# Patient Record
Sex: FEMALE
[De-identification: names, ages, dates, MRNs, and addresses within clinical notes are randomized per-mention and may not be internally consistent; named-entity substitution may affect disease eponyms.]

---

## 2018-02-08 ENCOUNTER — HOSPITAL ENCOUNTER (EMERGENCY)
Dept: HOSPITAL 42 - ED | Age: 38
Discharge: LEFT BEFORE DISCHARGE | End: 2018-02-08
Payer: COMMERCIAL

## 2018-02-08 VITALS
OXYGEN SATURATION: 100 % | HEART RATE: 76 BPM | SYSTOLIC BLOOD PRESSURE: 138 MMHG | TEMPERATURE: 99.2 F | RESPIRATION RATE: 18 BRPM | DIASTOLIC BLOOD PRESSURE: 83 MMHG

## 2018-02-08 VITALS — BODY MASS INDEX: 29.5 KG/M2

## 2018-02-08 DIAGNOSIS — R10.9: ICD-10-CM

## 2018-02-08 DIAGNOSIS — Z02.89: Primary | ICD-10-CM

## 2018-05-15 ENCOUNTER — HOSPITAL ENCOUNTER (EMERGENCY)
Dept: HOSPITAL 42 - ED | Age: 38
Discharge: HOME | End: 2018-05-15
Payer: COMMERCIAL

## 2018-05-15 VITALS
RESPIRATION RATE: 17 BRPM | OXYGEN SATURATION: 100 % | SYSTOLIC BLOOD PRESSURE: 122 MMHG | TEMPERATURE: 98.2 F | DIASTOLIC BLOOD PRESSURE: 83 MMHG

## 2018-05-15 VITALS — HEART RATE: 72 BPM

## 2018-05-15 VITALS — BODY MASS INDEX: 29.1 KG/M2

## 2018-05-15 DIAGNOSIS — N39.0: Primary | ICD-10-CM

## 2018-05-15 DIAGNOSIS — E78.1: ICD-10-CM

## 2018-05-15 LAB
ALBUMIN SERPL-MCNC: 4.4 G/DL (ref 3–4.8)
ALBUMIN/GLOB SERPL: 1.3 {RATIO} (ref 1.1–1.8)
ALT SERPL-CCNC: 35 U/L (ref 7–56)
AMORPH SED URNS QL MICRO: (no result)
APPEARANCE UR: CLEAR
AST SERPL-CCNC: 26 U/L (ref 14–36)
BACTERIA #/AREA URNS HPF: (no result) /[HPF]
BASOPHILS # BLD AUTO: 0.02 K/MM3 (ref 0–2)
BASOPHILS NFR BLD: 0.3 % (ref 0–3)
BILIRUB UR-MCNC: NEGATIVE MG/DL
BUN SERPL-MCNC: 12 MG/DL (ref 7–21)
CALCIUM SERPL-MCNC: 9.4 MG/DL (ref 8.4–10.5)
COLOR UR: YELLOW
EOSINOPHIL # BLD: 0 10*3/UL (ref 0–0.7)
EOSINOPHIL NFR BLD: 0.4 % (ref 1.5–5)
EPI CELLS #/AREA URNS HPF: (no result) /HPF (ref 0–5)
ERYTHROCYTE [DISTWIDTH] IN BLOOD BY AUTOMATED COUNT: 13.6 % (ref 11.5–14.5)
GFR NON-AFRICAN AMERICAN: > 60
GLUCOSE UR STRIP-MCNC: NEGATIVE MG/DL
GRANULOCYTES # BLD: 4.08 10*3/UL (ref 1.4–6.5)
GRANULOCYTES NFR BLD: 58 % (ref 50–68)
HGB BLD-MCNC: 14.5 G/DL (ref 12–16)
LEUKOCYTE ESTERASE UR-ACNC: (no result) LEU/UL
LIPASE SERPL-CCNC: 54 U/L (ref 23–300)
LYMPHOCYTES # BLD: 2 10*3/UL (ref 1.2–3.4)
LYMPHOCYTES NFR BLD AUTO: 27.7 % (ref 22–35)
MCH RBC QN AUTO: 28 PG (ref 25–35)
MCHC RBC AUTO-ENTMCNC: 33.6 G/DL (ref 31–37)
MCV RBC AUTO: 83.4 FL (ref 80–105)
MONOCYTES # BLD AUTO: 1 10*3/UL (ref 0.1–0.6)
MONOCYTES NFR BLD: 13.6 % (ref 1–6)
PH UR STRIP: 6 [PH] (ref 4.7–8)
PLATELET # BLD: 308 10^3/UL (ref 120–450)
PMV BLD AUTO: 10.1 FL (ref 7–11)
PROT UR STRIP-MCNC: (no result) MG/DL
RBC # BLD AUTO: 5.18 10^6/UL (ref 3.5–6.1)
RBC # UR STRIP: (no result) /UL
SP GR UR STRIP: 1.02 (ref 1–1.03)
URINE FINE GRANULAR CAST: (no result) /HPF (ref 0–2)
UROBILINOGEN UR STRIP-ACNC: 0.2 E.U./DL
WBC # BLD AUTO: 7 10^3/UL (ref 4.5–11)
WBC #/AREA URNS HPF: (no result) /HPF (ref 0–6)

## 2018-05-15 PROCEDURE — 85025 COMPLETE CBC W/AUTO DIFF WBC: CPT

## 2018-05-15 PROCEDURE — 81001 URINALYSIS AUTO W/SCOPE: CPT

## 2018-05-15 PROCEDURE — 74177 CT ABD & PELVIS W/CONTRAST: CPT

## 2018-05-15 PROCEDURE — 83690 ASSAY OF LIPASE: CPT

## 2018-05-15 PROCEDURE — 96374 THER/PROPH/DIAG INJ IV PUSH: CPT

## 2018-05-15 PROCEDURE — 96361 HYDRATE IV INFUSION ADD-ON: CPT

## 2018-05-15 PROCEDURE — 80053 COMPREHEN METABOLIC PANEL: CPT

## 2018-05-15 PROCEDURE — 99283 EMERGENCY DEPT VISIT LOW MDM: CPT

## 2018-05-15 PROCEDURE — 87086 URINE CULTURE/COLONY COUNT: CPT

## 2018-05-15 NOTE — CT
PROCEDURE:  CT Abdomen and Pelvis with contrast



HISTORY:

abd pain



COMPARISON:

None.



TECHNIQUE:

Following the intravenous administration of iodinated contrast 

material, a CT examination of the abdomen and pelvis performed from 

the domes of the diaphragms to the symphysis pubis with reformatted 

datasets provided not only axial but also sagittal and coronal 

planes. Oral contrast was not administered as per referring physician 

request.



Contrast dose: Omnipaque 350, 100 cc



Radiation dose:



Total exam DLP = 628.22 mGy-cm.



This CT exam was performed using one or more of the following dose 

reduction techniques: Automated exposure control, adjustment of the 

mA and/or kV according to patient size, and/or use of iterative 

reconstruction technique.



FINDINGS:



LOWER THORAX:

Unremarkable. 



LIVER:

Diffuse hepatic steatosis identified without mass or intrahepatic 

biliary dilatation evident grossly. 



GALLBLADDER AND BILE DUCTS:

Unremarkable. 



PANCREAS:

Unremarkable. No gross lesion or ductal dilatation.



SPLEEN:

Unremarkable. 



ADRENALS:

Unremarkable. No mass. 



KIDNEYS AND URETERS:

Unremarkable. No hydronephrosis. No solid mass. 



VASCULATURE:

Unremarkable. No aortic aneurysm. 



BOWEL:

The stomach is largely collapsed.  There is no bowel obstruction 

appreciated or other suspicious bowel finding.  Moderate fecal 

loading is seen throughout the proximal and middle thirds of the 

colon.  Sigmoid diverticular changes are identified without 

diverticulitis. 



APPENDIX:

Normal appendix. 



PERITONEUM:

Unremarkable. No free fluid. No free air. 



LYMPH NODES:

Unremarkable. No enlarged lymph nodes. 



BLADDER:

Unremarkable. 



REPRODUCTIVE:

Nonspecific heterogeneous enhancement is seen in the subtle fashion 

throughout the uterus may reflect limited fibroid disease. No 

suspicious adnexal findings bilaterally. 



BONES:

No acute fracture. 



OTHER FINDINGS:

None.



IMPRESSION:

1. Hepatic steatosis with liver otherwise unremarkable. 



2. Sigmoid diverticulosis without diverticulitis. 



3. Nonspecific inhomogeneous enhancement of the uterus may reflect 

underlying fibroids though this is not definite.

## 2018-05-15 NOTE — ED PDOC
Arrival/HPI





- General


Chief Complaint: Female Genitourinary


Time Seen by Provider: 05/15/18 15:21


Historian: Patient





- History of Present Illness


Narrative History of Present Illness (Text): 


05/15/18 15:44


A 38 year old female presents to the emergency department complaining of lower 

abdominal pain since earlier today. Patient is Tajik speaking, history 

obtained through scribe. Patient notes associated nausea, dysuria and mild 

lower back pain. Patient denies any trauma, fever, chills, vomiting, diarrhea, 

chest pain,shortness of breath or any other complaints.





Time/Duration: Other (today)


Symptom Course: Unchanged


Context: Home





Past Medical History





- Provider Review


Nursing Documentation Reviewed: Yes





- Infectious Disease


Hx of Infectious Diseases: None





- Cardiac


Other/Comment: High Triglycerides





- Psychiatric


Hx Substance Use: No





- Surgical History


Hx  Section: Yes





Family/Social History





- Physician Review


Nursing Documentation Reviewed: Yes


Family/Social History: No Known Family HX


Smoking Status: Never Smoked


Hx Alcohol Use: No


Hx Substance Use: No





Allergies/Home Meds


Allergies/Adverse Reactions: 


Allergies





No Known Allergies Allergy (Verified 05/15/18 14:49)


 











Review of Systems





- Physician Review


All systems were reviewed & negative as marked: Yes





- Review of Systems


Constitutional: absent: Fevers, Night Sweats


Respiratory: absent: SOB, Cough


Cardiovascular: absent: Chest Pain, Palpitations


Gastrointestinal: Abdominal Pain, Nausea.  absent: Diarrhea, Vomiting


Genitourinary Female: Dysuria.  absent: Frequency, Hematuria, Vaginal Bleeding, 

Vaginal Discharge


Musculoskeletal: Back Pain.  absent: Arthralgias, Neck Pain


Skin: absent: Rash, Pruritis


Neurological: absent: Headache, Dizziness


Psychiatric: absent: Anxiety, Depression





Physical Exam


Vital Signs Reviewed: Yes


Vital Signs











  Temp Pulse Resp BP Pulse Ox


 


 05/15/18 17:30   72  18  119/77  99


 


 05/15/18 14:49  97.9 F  79  18  128/85  100











Temperature: Afebrile


Blood Pressure: Normal


Pulse: Regular


Respiratory Rate: Normal


Appearance: Positive for: Well-Appearing, Non-Toxic, Comfortable


Pain Distress: None


Mental Status: Positive for: Alert and Oriented X 3





- Systems Exam


Head: Present: Atraumatic, Normocephalic


Mouth: Present: Moist Mucous Membranes


Neck: Present: Normal Range of Motion


Respiratory/Chest: Present: Clear to Auscultation, Good Air Exchange.  No: 

Respiratory Distress, Accessory Muscle Use


Cardiovascular: Present: Regular Rate and Rhythm, Normal S1, S2.  No: Murmurs


Abdomen: Present: Tenderness (Right upper quadrant, right lower quadrant and 

superpubic region tenderness), Normal Bowel Sounds.  No: Distention, Peritoneal 

Signs, Rebound, Guarding


Back: Present: Normal Inspection.  No: CVA Tenderness, Midline Tenderness, 

Paraspinal Tenderness


Upper Extremity: Present: Normal ROM


Lower Extremity: Present: Normal ROM


Neurological: Present: GCS=15, Speech Normal


Skin: Present: Warm, Dry, Normal Color.  No: Rashes


Psychiatric: Present: Alert, Oriented x 3





Medical Decision Making


ED Course and Treatment: 


05/15/18 15:44


Impression:


A 38 year old female with suprapubic and right sided abdominal pain





Plan:


-- Abdomen and pelvis CT


-- Labs


-- Urine culture and Urinalysis


-- Toradol and IV fluids


-- Reassess and disposition





Progress Notes:





05/15/18 20:10


Patient is nontoxic well appearing with stable vital signs presenting with rlq, 

ruq and suprapubic abdominal pain





CBC wnl


CMP wnl








Urinalysis: + wbcs, + leukocytes








CAT scan: FINDINGS:


LOWER THORAX:


Unremarkable. 


LIVER:


Diffuse hepatic steatosis identified without mass or intrahepatic biliary 

dilatation evident grossly. 


GALLBLADDER AND BILE DUCTS:


Unremarkable. 


PANCREAS:


Unremarkable. No gross lesion or ductal dilatation.


SPLEEN:


Unremarkable. 


ADRENALS:


Unremarkable. No mass. 


KIDNEYS AND URETERS:


Unremarkable. No hydronephrosis. No solid mass. 


VASCULATURE:


Unremarkable. No aortic aneurysm. 


BOWEL:


The stomach is largely collapsed.  There is no bowel obstruction appreciated or 

other suspicious bowel finding.  Moderate fecal loading is seen throughout the 

proximal and middle thirds of the colon.  Sigmoid diverticular changes are 

identified without diverticulitis. 


APPENDIX:


Normal appendix. 


PERITONEUM:


Unremarkable. No free fluid. No free air. 


LYMPH NODES:


Unremarkable. No enlarged lymph nodes. 


BLADDER:


Unremarkable. 


REPRODUCTIVE:


Nonspecific heterogeneous enhancement is seen in the subtle fashion throughout 

the uterus may reflect limited fibroid disease. No suspicious adnexal findings 

bilaterally. 


BONES:


No acute fracture. 


OTHER FINDINGS:


None.


IMPRESSION:


1. Hepatic steatosis with liver otherwise unremarkable. 


2. Sigmoid diverticulosis without diverticulitis. 


3. Nonspecific inhomogeneous enhancement of the uterus may reflect underlying 

fibroids though this is not definite.





Patient reassessment: pt is non toxic well appearing no distress. feeling 

better after medications. 








kelfex given po 











Discussed all results with patient in depth, advised f/u with pmd, advised 

taking antibiotic as prescribed and return if symptoms worsen, persist or if 

new symptoms develop. 








Patient verbalizes understanding of discharge instructions and need for 

immediate followup.








all aspects of this case were discussed the attending of record. 











Impression: Abdominal pain, UTI


Motrin every 6 hours as needed for pain


keflex; 1 capsule twice daily x 7 days. 


Follow up with primary care physician within the next 2 days


Follow up with the urologist within the next 2 days. 


Return immediately if symptoms worsen persist or if new symptoms develop: High 

fevers, increasing pain, vomiting, diarrhea or any other concerning symptoms 

develop


Reassessment Condition: Re-examined, Improved





- Lab Interpretations


Lab Results: 








 05/15/18 16:59 





 05/15/18 16:59 





 Lab Results





05/15/18 16:59: WBC 7.0  D, RBC 5.18, Hgb 14.5, Hct 43.2, MCV 83.4, MCH 28.0, 

MCHC 33.6, RDW 13.6, Plt Count 308, MPV 10.1, Gran % 58.0, Lymph % (Auto) 27.7, 

Mono % (Auto) 13.6 H, Eos % (Auto) 0.4 L, Baso % (Auto) 0.3, Gran # 4.08, Lymph 

# (Auto) 2.0, Mono # (Auto) 1.0 H, Eos # (Auto) 0.0, Baso # (Auto) 0.02


05/15/18 16:59: Sodium 147, Potassium 3.8, Chloride 107, Carbon Dioxide 27, 

Anion Gap 17, BUN 12, Creatinine 0.6 L, Est GFR ( Amer) > 60, Est GFR (

Non-Af Amer) > 60, Random Glucose 103, Calcium 9.4, Total Bilirubin 0.2, AST 26

, ALT 35, Alkaline Phosphatase 76, Total Protein 7.7, Albumin 4.4, Globulin 3.3

, Albumin/Globulin Ratio 1.3, Lipase 54


05/15/18 15:27: Urine Color Yellow, Urine Appearance Clear, Urine pH 6.0, Ur 

Specific Gravity 1.025, Urine Protein Trace H, Urine Glucose (UA) Negative, 

Urine Ketones Negative, Urine Blood Trace-intact H, Urine Nitrate Negative, 

Urine Bilirubin Negative, Urine Urobilinogen 0.2, Ur Leukocyte Esterase Large H

, Urine RBC 2 - 5, Urine WBC 25 - 30, Ur Epithelial Cells Many, Amorphous 

Sediment Small, Urine Bacteria Many, Fine Granular Casts 0 - 2, Urine Other 

Uyeast








I have reviewed the lab results: Yes





- RAD Interpretation


Radiology Orders: 








05/15/18 15:35


ABD & PELVIS IV CONTRAST ONLY [CT] Stat 














- Medication Orders


Current Medication Orders: 











Discontinued Medications





Cephalexin Monohydrate (Keflex)  500 mg PO STAT STA


   PRN Reason: Protocol


   Stop: 05/15/18 19:28


   Last Admin: 05/15/18 19:39  Dose: 500 mg





Sodium Chloride (Sodium Chloride 0.9%)  1,000 mls @ 999 mls/hr IV .Q1H1M STA


   Stop: 05/15/18 16:35


   Last Admin: 05/15/18 16:20  Dose: 999 mls/hr





eMAR Start Stop


 Document     05/15/18 16:20  NADIRA  (Rec: 05/15/18 16:53  NADIRA LINDSAY-PC)


     Intravenous Solution


      Start Date                                 05/15/18


      Start Time                                 16:20


      End Date                                   05/15/18


      End time                                   17:20


      Total Infusion Time                        60





Ketorolac Tromethamine (Toradol)  30 mg IVP STAT STA


   Stop: 05/15/18 15:36


   Last Admin: 05/15/18 16:20  Dose: 30 mg





MAR Pain Assessment


 Document     05/15/18 16:20  NADIRA  (Rec: 05/15/18 16:52  NADIRA LINDSAY-PC)


     Pain Reassessment


      Is this a pain reassessment?               No


     Sleep


      Is patient sleeping during reassessment?   No


     Presence of Pain


      Presence of Pain                           Yes


IVP Administration


 Document     05/15/18 16:20  NADIRA  (Rec: 05/15/18 16:52  NADIRA LINDSAY-PC)


     Charges for Administration


      # of IVP Administrations                   1


Re-Assess: MAR Pain Assessment


 Document     05/15/18 17:20  NADIRA  (Rec: 05/15/18 18:25  NADIRA LINDSAY-PC)


     Pain Reassessment


      Is this a pain reassessment?               Yes


     Sleep


      Is patient sleeping during reassessment?   No


     Presence of Pain


      Presence of Pain                           No














- Scribe Statement


The provider has reviewed the documentation as recorded by the Charitoibaudi Mak





Provider Scribe Attestation:


All medical record entries made by the Scribe were at my direction and 

personally dictated by me. I have reviewed the chart and agree that the record 

accurately reflects my personal performance of the history, physical exam, 

medical decision making, and the department course for this patient. I have 

also personally directed, reviewed, and agree with the discharge instructions 

and disposition.








Disposition/Present on Arrival





- Present on Arrival


Any Indicators Present on Arrival: No


History of DVT/PE: No


History of Uncontrolled Diabetes: No


Urinary Catheter: No


History of Decub. Ulcer: No


History Surgical Site Infection Following: None





- Disposition


Have Diagnosis and Disposition been Completed?: Yes


Diagnosis: 


 Urinary tract infection, Abdominal pain





Disposition: HOME/ ROUTINE


Disposition Time: 20:16


Patient Plan: Discharge


Patient Problems: 


 Current Active Problems











Problem Status Onset


 


Abdominal pain Acute  


 


Urinary tract infection Acute  











Condition: GOOD


Discharge Instructions (ExitCare):  Urinary Tract Infections in Adults, Acute 

Abdomen (Belly Pain), Adult (DC)


Additional Instructions: 


Motrin every 6 hours as needed for pain


keflex; 1 capsule twice daily x 7 days. 


Follow up with primary care physician within the next 2 days


Follow up with the urologist within the next 2 days. 


Return immediately if symptoms worsen persist or if new symptoms develop: High 

fevers, increasing pain, vomiting, diarrhea or any other concerning symptoms 

develop


Prescriptions: 


Cephalexin [Keflex] 500 mg PO BID #14 capsule


Ibuprofen [Motrin] 600 mg PO Q6H PRN #20 tab


 PRN Reason: pain/fever reduction


Referrals: 


Sushil Rodriguez DO [Staff Provider] - Follow up with primary


Amy Figueroa MD [Staff Provider] - Follow up with primary


Forms:  [employer] (English), WORK NOTE

## 2018-09-06 ENCOUNTER — HOSPITAL ENCOUNTER (EMERGENCY)
Dept: HOSPITAL 42 - ED | Age: 38
Discharge: HOME | End: 2018-09-06
Payer: COMMERCIAL

## 2018-09-06 VITALS — OXYGEN SATURATION: 99 % | RESPIRATION RATE: 18 BRPM | TEMPERATURE: 98.6 F

## 2018-09-06 VITALS — HEART RATE: 66 BPM | SYSTOLIC BLOOD PRESSURE: 127 MMHG | DIASTOLIC BLOOD PRESSURE: 80 MMHG

## 2018-09-06 VITALS — BODY MASS INDEX: 26.3 KG/M2

## 2018-09-06 DIAGNOSIS — R53.83: ICD-10-CM

## 2018-09-06 DIAGNOSIS — N39.0: Primary | ICD-10-CM

## 2018-09-06 LAB
ALBUMIN SERPL-MCNC: 4.2 G/DL (ref 3–4.8)
ALBUMIN/GLOB SERPL: 1.3 {RATIO} (ref 1.1–1.8)
ALT SERPL-CCNC: 23 U/L (ref 7–56)
APPEARANCE UR: (no result)
AST SERPL-CCNC: 23 U/L (ref 14–36)
BACTERIA #/AREA URNS HPF: (no result) /[HPF]
BASOPHILS # BLD AUTO: 0.03 K/MM3 (ref 0–2)
BASOPHILS NFR BLD: 0.4 % (ref 0–3)
BILIRUB UR-MCNC: NEGATIVE MG/DL
BUN SERPL-MCNC: 10 MG/DL (ref 7–21)
CALCIUM SERPL-MCNC: 8.5 MG/DL (ref 8.4–10.5)
COLOR UR: YELLOW
EOSINOPHIL # BLD: 0.1 10*3/UL (ref 0–0.7)
EOSINOPHIL NFR BLD: 0.9 % (ref 1.5–5)
EPI CELLS #/AREA URNS HPF: (no result) /HPF (ref 0–5)
ERYTHROCYTE [DISTWIDTH] IN BLOOD BY AUTOMATED COUNT: 13.3 % (ref 11.5–14.5)
GFR NON-AFRICAN AMERICAN: > 60
GLUCOSE UR STRIP-MCNC: NEGATIVE MG/DL
GRANULOCYTES # BLD: 4.1 10*3/UL (ref 1.4–6.5)
GRANULOCYTES NFR BLD: 58.4 % (ref 50–68)
HGB BLD-MCNC: 14.3 G/DL (ref 12–16)
LEUKOCYTE ESTERASE UR-ACNC: (no result) LEU/UL
LYMPHOCYTES # BLD: 1.8 10*3/UL (ref 1.2–3.4)
LYMPHOCYTES NFR BLD AUTO: 26 % (ref 22–35)
MCH RBC QN AUTO: 28.3 PG (ref 25–35)
MCHC RBC AUTO-ENTMCNC: 34.1 G/DL (ref 31–37)
MCV RBC AUTO: 82.8 FL (ref 80–105)
MONOCYTES # BLD AUTO: 1 10*3/UL (ref 0.1–0.6)
MONOCYTES NFR BLD: 14.3 % (ref 1–6)
PH UR STRIP: 7 [PH] (ref 4.7–8)
PLATELET # BLD: 309 10^3/UL (ref 120–450)
PMV BLD AUTO: 10.6 FL (ref 7–11)
PROT UR STRIP-MCNC: NEGATIVE MG/DL
RBC # BLD AUTO: 5.06 10^6/UL (ref 3.5–6.1)
RBC # UR STRIP: NEGATIVE /UL
RBC #/AREA URNS HPF: (no result) /HPF (ref 0–2)
SP GR UR STRIP: 1.01 (ref 1–1.03)
UROBILINOGEN UR STRIP-ACNC: 0.2 E.U./DL
WBC # BLD AUTO: 7 10^3/UL (ref 4.5–11)

## 2018-09-06 PROCEDURE — 81001 URINALYSIS AUTO W/SCOPE: CPT

## 2018-09-06 PROCEDURE — 83735 ASSAY OF MAGNESIUM: CPT

## 2018-09-06 PROCEDURE — 80053 COMPREHEN METABOLIC PANEL: CPT

## 2018-09-06 PROCEDURE — 85025 COMPLETE CBC W/AUTO DIFF WBC: CPT

## 2018-09-06 PROCEDURE — 96365 THER/PROPH/DIAG IV INF INIT: CPT

## 2018-09-06 PROCEDURE — 96361 HYDRATE IV INFUSION ADD-ON: CPT

## 2018-09-06 PROCEDURE — 87086 URINE CULTURE/COLONY COUNT: CPT

## 2018-09-06 PROCEDURE — 82948 REAGENT STRIP/BLOOD GLUCOSE: CPT

## 2018-09-06 PROCEDURE — 99285 EMERGENCY DEPT VISIT HI MDM: CPT

## 2018-09-06 NOTE — ED PDOC
Arrival/HPI





- General


Time Seen by Provider: 18 14:32


Historian: Patient





- History of Present Illness


Narrative History of Present Illness (Text): 





18 14:32


39 y/o female, pmh including UTI, nkda, c/o urinary frequency x 4 days and 

fatigue started today.  Pt. stated that she has urinary frequency for the past 

4 days, no burning sensation, no flank pain, no pelvic pain, no night sweat, no 

rash, no change in vision or dizziness, no rash, no other medical or 

psychological complaints. 





Past Medical History





- Provider Review


Nursing Documentation Reviewed: Yes





- Infectious Disease


Hx of Infectious Diseases: None





- Cardiac


Other/Comment: High Triglycerides





- Psychiatric


Hx Substance Use: No





- Surgical History


Hx  Section: Yes





Family/Social History





- Physician Review


Nursing Documentation Reviewed: Yes


Family/Social History: Unknown Family HX


Smoking Status: Never Smoked


Hx Alcohol Use: No


Hx Substance Use: No





Allergies/Home Meds


Allergies/Adverse Reactions: 


Allergies





No Known Allergies Allergy (Verified 18 15:13)


 











Review of Systems





- Review of Systems


Constitutional: Fatigue.  absent: Fevers


Eyes: absent: Vision Changes


ENT: absent: Hearing Changes


Respiratory: absent: SOB, Cough


Cardiovascular: absent: Chest Pain


Gastrointestinal: absent: Abdominal Pain, Nausea, Vomiting


Genitourinary Female: Other (+frequency).  absent: Dysuria, Hematuria, Vaginal 

Bleeding, Vaginal Discharge


Skin: absent: Rash, Pruritis


Neurological: absent: Headache, Dizziness


Psychiatric: absent: Anxiety, Depression





Physical Exam


Vital Signs Reviewed: Yes


Vital Signs











  Temp Pulse Resp BP Pulse Ox


 


 18 17:35   65  18  128/74  99


 


 18 16:41   69  18  131/79  99


 


 18 15:06  98.6 F  74  18  133/85  99











Temperature: Afebrile


Blood Pressure: Normal


Pulse: Regular


Respiratory Rate: Normal


Appearance: Positive for: Well-Appearing, Non-Toxic, Comfortable


Pain Distress: None


Mental Status: Positive for: Alert and Oriented X 3





- Systems Exam


Head: Present: Atraumatic, Normocephalic


Pupils: Present: PERRL


Extroacular Muscles: Present: EOMI


Conjunctiva: Present: Normal


Mouth: Present: Moist Mucous Membranes


Neck: Present: Normal Range of Motion


Respiratory/Chest: Present: Clear to Auscultation, Good Air Exchange.  No: 

Respiratory Distress, Accessory Muscle Use


Cardiovascular: Present: Regular Rate and Rhythm, Normal S1, S2.  No: Murmurs


Abdomen: No: Tenderness, Distention, Peritoneal Signs, Rebound, Guarding


Back: Present: Normal Inspection


Upper Extremity: Present: Normal Inspection.  No: Cyanosis, Edema


Lower Extremity: Present: Normal Inspection.  No: Edema


Neurological: Present: GCS=15, CN II-XII Intact, Speech Normal


Skin: Present: Warm, Dry, Normal Color.  No: Rashes


Psychiatric: Present: Alert, Oriented x 3, Normal Insight, Normal Concentration





Medical Decision Making


ED Course and Treatment: 





18 15:16


-Labs/ua/pregnancy


-IVF


-Observe and reassess





18 17:53


-Labs show no acute findings


-UA show +UTI, rocephine ordered


-Pt. feels much better after IVF, will discharge home


-Discharge home with macrobid, bed rest, stay hydrated, follow up with your own 

pmd within 2 days and repeat UA after 7 days to ensure the resolution of the UTI

, return to the ER for any new or worsening signs or symptoms. 





- Lab Interpretations


Lab Results: 








 18 15:15 





 18 16:57 





 Lab Results





18 16:57: Sodium 143, Potassium 3.8, Chloride 109 H, Carbon Dioxide 22, 

Anion Gap 16, BUN 10, Creatinine 0.6 L, Est GFR ( Amer) > 60, Est GFR (

Non-Af Amer) > 60, Random Glucose 84, Calcium 8.5, Magnesium 2.2, Total 

Bilirubin 0.6, AST 23, ALT 23, Alkaline Phosphatase 83, Total Protein 7.5, 

Albumin 4.2, Globulin 3.3, Albumin/Globulin Ratio 1.3


18 15:26: POC Glucose (mg/dL) 96


18 15:15: WBC 7.0, RBC 5.06, Hgb 14.3, Hct 41.9, MCV 82.8, MCH 28.3, MCHC 

34.1, RDW 13.3, Plt Count 309, MPV 10.6, Gran % 58.4, Lymph % (Auto) 26.0, Mono 

% (Auto) 14.3 H, Eos % (Auto) 0.9 L, Baso % (Auto) 0.4, Gran # 4.10, Lymph # (

Auto) 1.8, Mono # (Auto) 1.0 H, Eos # (Auto) 0.1, Baso # (Auto) 0.03


18 15:15: Urine Color Yellow, Urine Appearance Sl cloudy, Urine pH 7.0, 

Ur Specific Gravity 1.015, Urine Protein Negative, Urine Glucose (UA) Negative, 

Urine Ketones Trace H, Urine Blood Negative, Urine Nitrate Negative, Urine 

Bilirubin Negative, Urine Urobilinogen 0.2, Ur Leukocyte Esterase Large H, 

Urine RBC 0 - 2, Urine WBC 10 - 15, Ur Epithelial Cells Many, Urine Bacteria Mod











- Medication Orders


Current Medication Orders: 











Discontinued Medications





Sodium Chloride (Sodium Chloride 0.9%)  1,000 mls @ 999 mls/hr IV .Q1H1M STA


   Stop: 18 16:15


   Last Admin: 18 15:29  Dose: 999 mls/hr





eMAR Start Stop


 Document     18 15:29  SF  (Rec: 18 15:29  SF  [facility] [id number])


     Intravenous Solution


      Start Date                                 18


      Start Time                                 15:29


      End Date                                   18


      End time                                   16:30


      Total Infusion Time                        61














- PA / NP / Resident Statement


MD/DO has reviewed & agrees with the documentation as recorded.





Disposition/Present on Arrival





- Present on Arrival


Any Indicators Present on Arrival: No


History of DVT/PE: No


History of Uncontrolled Diabetes: No


Urinary Catheter: No


History of Decub. Ulcer: No


History Surgical Site Infection Following: None





- Disposition


Have Diagnosis and Disposition been Completed?: Yes


Diagnosis: 


 UTI (urinary tract infection), Fatigue





Disposition: HOME/ ROUTINE


Disposition Time: 17:55


Patient Plan: Discharge


Condition: IMPROVED


Additional Instructions: 


-Discharge home with macrobid, bed rest, stay hydrated, follow up with your own 

pmd within 2 days and repeat UA after 7 days to ensure the resolution of the UTI

, return to the ER for any new or worsening signs or symptoms. 


Prescriptions: 


Nitrofurantoin Macrocrystals [Macrobid] 100 mg PO BID #14 cap


Referrals: 


[facility] at [facility] [place] [Outside] - Follow up with primary


Forms:  WORK NOTE